# Patient Record
Sex: FEMALE | Race: WHITE | Employment: FULL TIME | URBAN - METROPOLITAN AREA
[De-identification: names, ages, dates, MRNs, and addresses within clinical notes are randomized per-mention and may not be internally consistent; named-entity substitution may affect disease eponyms.]

---

## 2018-01-09 PROBLEM — Z01.89 ENCOUNTER FOR PAIN MANAGEMENT PLANNING: Status: ACTIVE | Noted: 2018-01-09

## 2018-01-09 PROBLEM — M51.26 LUMBAGO DUE TO DISPLACEMENT OF INTERVERTEBRAL DISC: Status: ACTIVE | Noted: 2018-01-09

## 2018-01-09 PROBLEM — M47.26 OSTEOARTHRITIS OF SPINE WITH RADICULOPATHY, LUMBAR REGION: Status: ACTIVE | Noted: 2018-01-09

## 2018-01-12 PROCEDURE — 80307 DRUG TEST PRSMV CHEM ANLYZR: CPT | Performed by: INTERNAL MEDICINE

## 2019-08-19 RX ORDER — PREGABALIN 25 MG/1
25 CAPSULE ORAL 2 TIMES DAILY
COMMUNITY
End: 2019-10-21

## 2019-08-19 RX ORDER — OXYCODONE AND ACETAMINOPHEN 10; 325 MG/1; MG/1
1 TABLET ORAL EVERY 4 HOURS PRN
COMMUNITY
End: 2019-08-22

## 2019-08-22 RX ORDER — CHLORZOXAZONE 750 MG/1
375 TABLET ORAL 2 TIMES DAILY
COMMUNITY
End: 2020-05-12

## 2019-08-27 ENCOUNTER — HOSPITAL ENCOUNTER (OUTPATIENT)
Dept: INTERVENTIONAL RADIOLOGY/VASCULAR | Facility: HOSPITAL | Age: 44
Discharge: HOME | End: 2019-08-27
Attending: OBSTETRICS & GYNECOLOGY | Admitting: OBSTETRICS & GYNECOLOGY
Payer: COMMERCIAL

## 2019-08-27 VITALS
HEIGHT: 63 IN | HEART RATE: 81 BPM | SYSTOLIC BLOOD PRESSURE: 100 MMHG | WEIGHT: 138 LBS | RESPIRATION RATE: 16 BRPM | BODY MASS INDEX: 24.45 KG/M2 | DIASTOLIC BLOOD PRESSURE: 60 MMHG | OXYGEN SATURATION: 97 % | TEMPERATURE: 98 F

## 2019-08-27 DIAGNOSIS — N94.89 PELVIC CONGESTION: ICD-10-CM

## 2019-08-27 LAB — INR: 1.1 (ref 0.8–1.3)

## 2019-08-27 PROCEDURE — B51HYZZ FLUOROSCOPY OF BILATERAL PELVIC (ILIAC) VEINS USING OTHER CONTRAST: ICD-10-PCS | Performed by: RADIOLOGY

## 2019-08-27 PROCEDURE — B51VYZZ FLUOROSCOPY OF OTHER VEINS USING OTHER CONTRAST: ICD-10-PCS | Performed by: RADIOLOGY

## 2019-08-27 PROCEDURE — 76937 US GUIDE VASCULAR ACCESS: CPT

## 2019-08-27 PROCEDURE — 75822 VEIN X-RAY ARMS/LEGS: CPT

## 2019-08-27 PROCEDURE — 36011 PLACE CATHETER IN VEIN: CPT

## 2019-08-27 PROCEDURE — 85610 PROTHROMBIN TIME: CPT

## 2019-08-27 PROCEDURE — 36012 PLACE CATHETER IN VEIN: CPT

## 2019-08-27 PROCEDURE — 99153 MOD SED SAME PHYS/QHP EA: CPT

## 2019-08-27 PROCEDURE — 75831 VEIN X-RAY KIDNEY: CPT

## 2019-08-27 PROCEDURE — 99152 MOD SED SAME PHYS/QHP 5/>YRS: CPT

## 2019-08-27 PROCEDURE — B51KYZZ FLUOROSCOPY OF LEFT RENAL VEIN USING OTHER CONTRAST: ICD-10-PCS | Performed by: RADIOLOGY

## 2019-08-27 RX ORDER — HEPARIN SODIUM 5000 [USP'U]/ML
INJECTION, SOLUTION INTRAVENOUS; SUBCUTANEOUS
Status: COMPLETED
Start: 2019-08-27 | End: 2019-08-27

## 2019-08-27 RX ORDER — MIDAZOLAM HYDROCHLORIDE 1 MG/ML
INJECTION INTRAMUSCULAR; INTRAVENOUS
Status: COMPLETED
Start: 2019-08-27 | End: 2019-08-27

## 2019-08-27 RX ORDER — LIDOCAINE HYDROCHLORIDE 10 MG/ML
INJECTION, SOLUTION INFILTRATION; PERINEURAL
Status: COMPLETED
Start: 2019-08-27 | End: 2019-08-27

## 2019-08-27 RX ORDER — SODIUM CHLORIDE 9 MG/ML
INJECTION, SOLUTION INTRAVENOUS CONTINUOUS
Status: DISCONTINUED | OUTPATIENT
Start: 2019-08-27 | End: 2019-08-27

## 2019-08-27 RX ADMIN — SODIUM CHLORIDE: 9 INJECTION, SOLUTION INTRAVENOUS at 08:30:00

## 2019-09-03 NOTE — H&P
[facility]    History and Physical    Formerly [facility] Patient Status:  Surgery Admit Karyle Norma    5/10/1975 MRN [id number]   [facility] SURGERY Attending Josie Cooper MD   Hosp Day # 0 PCP PHYSICIAN NONSTAFF     Date:  9/3/2019  D other systems reviewed and are negative. Physical Exam:   Vital Signs:  Height 5' 3\" (1.6 m), weight 134 lb (60.8 kg), last menstrual period 08/08/2019, not currently breastfeeding. Constitutional: She is oriented to person, place, and time.  She

## 2019-09-04 ENCOUNTER — ANESTHESIA EVENT (OUTPATIENT)
Dept: SURGERY | Facility: HOSPITAL | Age: 44
End: 2019-09-04
Payer: COMMERCIAL

## 2019-09-04 ENCOUNTER — HOSPITAL ENCOUNTER (OUTPATIENT)
Facility: HOSPITAL | Age: 44
Discharge: HOME | End: 2019-09-05
Attending: OBSTETRICS & GYNECOLOGY | Admitting: OBSTETRICS & GYNECOLOGY
Payer: COMMERCIAL

## 2019-09-04 ENCOUNTER — ANESTHESIA (OUTPATIENT)
Dept: SURGERY | Facility: HOSPITAL | Age: 44
End: 2019-09-04
Payer: COMMERCIAL

## 2019-09-04 LAB
EXPIRATION DATE: NORMAL
POCT LOT NUMBER: NORMAL
POCT URINE PREGNANCY: NEGATIVE

## 2019-09-04 PROCEDURE — 0UT74ZZ RESECTION OF BILATERAL FALLOPIAN TUBES, PERCUTANEOUS ENDOSCOPIC APPROACH: ICD-10-PCS | Performed by: OBSTETRICS & GYNECOLOGY

## 2019-09-04 PROCEDURE — 8E0W4CZ ROBOTIC ASSISTED PROCEDURE OF TRUNK REGION, PERCUTANEOUS ENDOSCOPIC APPROACH: ICD-10-PCS | Performed by: OBSTETRICS & GYNECOLOGY

## 2019-09-04 PROCEDURE — 0UT94ZZ RESECTION OF UTERUS, PERCUTANEOUS ENDOSCOPIC APPROACH: ICD-10-PCS | Performed by: OBSTETRICS & GYNECOLOGY

## 2019-09-04 PROCEDURE — 0DNP4ZZ RELEASE RECTUM, PERCUTANEOUS ENDOSCOPIC APPROACH: ICD-10-PCS | Performed by: OBSTETRICS & GYNECOLOGY

## 2019-09-04 PROCEDURE — 99255 IP/OBS CONSLTJ NEW/EST HI 80: CPT | Performed by: HOSPITALIST

## 2019-09-04 PROCEDURE — 3E0T3BZ INTRODUCTION OF ANESTHETIC AGENT INTO PERIPHERAL NERVES AND PLEXI, PERCUTANEOUS APPROACH: ICD-10-PCS | Performed by: ANESTHESIOLOGY

## 2019-09-04 PROCEDURE — 0UN24ZZ RELEASE BILATERAL OVARIES, PERCUTANEOUS ENDOSCOPIC APPROACH: ICD-10-PCS | Performed by: OBSTETRICS & GYNECOLOGY

## 2019-09-04 PROCEDURE — 06NY4ZZ RELEASE LOWER VEIN, PERCUTANEOUS ENDOSCOPIC APPROACH: ICD-10-PCS | Performed by: OBSTETRICS & GYNECOLOGY

## 2019-09-04 DEVICE — INTERCEED: Type: IMPLANTABLE DEVICE | Site: ABDOMEN | Status: FUNCTIONAL

## 2019-09-04 RX ORDER — ONDANSETRON 4 MG/1
4 TABLET, FILM COATED ORAL EVERY 8 HOURS PRN
Status: DISCONTINUED | OUTPATIENT
Start: 2019-09-04 | End: 2019-09-05

## 2019-09-04 RX ORDER — ONDANSETRON 2 MG/ML
4 INJECTION INTRAMUSCULAR; INTRAVENOUS AS NEEDED
Status: DISCONTINUED | OUTPATIENT
Start: 2019-09-04 | End: 2019-09-04 | Stop reason: HOSPADM

## 2019-09-04 RX ORDER — CEFAZOLIN SODIUM/WATER 2 G/20 ML
SYRINGE (ML) INTRAVENOUS
Status: DISPENSED
Start: 2019-09-04 | End: 2019-09-04

## 2019-09-04 RX ORDER — CEFAZOLIN SODIUM/WATER 2 G/20 ML
2 SYRINGE (ML) INTRAVENOUS ONCE
Status: DISCONTINUED | OUTPATIENT
Start: 2019-09-04 | End: 2019-09-04

## 2019-09-04 RX ORDER — PHENAZOPYRIDINE HYDROCHLORIDE 200 MG/1
TABLET, FILM COATED ORAL
Status: COMPLETED
Start: 2019-09-04 | End: 2019-09-04

## 2019-09-04 RX ORDER — SODIUM CHLORIDE, SODIUM LACTATE, POTASSIUM CHLORIDE, CALCIUM CHLORIDE 600; 310; 30; 20 MG/100ML; MG/100ML; MG/100ML; MG/100ML
INJECTION, SOLUTION INTRAVENOUS CONTINUOUS
Status: DISCONTINUED | OUTPATIENT
Start: 2019-09-04 | End: 2019-09-05

## 2019-09-04 RX ORDER — MIDAZOLAM HYDROCHLORIDE 1 MG/ML
0.5 INJECTION INTRAMUSCULAR; INTRAVENOUS EVERY 5 MIN PRN
Status: DISCONTINUED | OUTPATIENT
Start: 2019-09-04 | End: 2019-09-04 | Stop reason: HOSPADM

## 2019-09-04 RX ORDER — MIDAZOLAM HYDROCHLORIDE 1 MG/ML
INJECTION INTRAMUSCULAR; INTRAVENOUS
Status: COMPLETED
Start: 2019-09-04 | End: 2019-09-04

## 2019-09-04 RX ORDER — SODIUM CHLORIDE, SODIUM LACTATE, POTASSIUM CHLORIDE, CALCIUM CHLORIDE 600; 310; 30; 20 MG/100ML; MG/100ML; MG/100ML; MG/100ML
INJECTION, SOLUTION INTRAVENOUS CONTINUOUS
Status: DISCONTINUED | OUTPATIENT
Start: 2019-09-04 | End: 2019-09-04 | Stop reason: HOSPADM

## 2019-09-04 RX ORDER — DIPHENHYDRAMINE HCL 25 MG
25 CAPSULE ORAL NIGHTLY
Status: DISCONTINUED | OUTPATIENT
Start: 2019-09-04 | End: 2019-09-05

## 2019-09-04 RX ORDER — DEXAMETHASONE SODIUM PHOSPHATE 4 MG/ML
4 VIAL (ML) INJECTION AS NEEDED
Status: DISCONTINUED | OUTPATIENT
Start: 2019-09-04 | End: 2019-09-04 | Stop reason: HOSPADM

## 2019-09-04 RX ORDER — PREGABALIN 25 MG/1
25 CAPSULE ORAL 2 TIMES DAILY
Status: DISCONTINUED | OUTPATIENT
Start: 2019-09-04 | End: 2019-09-05

## 2019-09-04 RX ORDER — ACETAMINOPHEN 10 MG/ML
1000 INJECTION, SOLUTION INTRAVENOUS ONCE
Status: DISCONTINUED | OUTPATIENT
Start: 2019-09-04 | End: 2019-09-04 | Stop reason: HOSPADM

## 2019-09-04 RX ORDER — HYDROMORPHONE HYDROCHLORIDE 1 MG/ML
INJECTION, SOLUTION INTRAMUSCULAR; INTRAVENOUS; SUBCUTANEOUS
Status: COMPLETED
Start: 2019-09-04 | End: 2019-09-04

## 2019-09-04 RX ORDER — MAGNESIUM HYDROXIDE 1200 MG/15ML
LIQUID ORAL AS NEEDED
Status: DISCONTINUED | OUTPATIENT
Start: 2019-09-04 | End: 2019-09-04 | Stop reason: HOSPADM

## 2019-09-04 RX ORDER — METOCLOPRAMIDE HYDROCHLORIDE 5 MG/ML
10 INJECTION INTRAMUSCULAR; INTRAVENOUS AS NEEDED
Status: DISCONTINUED | OUTPATIENT
Start: 2019-09-04 | End: 2019-09-04 | Stop reason: HOSPADM

## 2019-09-04 RX ORDER — NALOXONE HYDROCHLORIDE 0.4 MG/ML
80 INJECTION, SOLUTION INTRAMUSCULAR; INTRAVENOUS; SUBCUTANEOUS AS NEEDED
Status: DISCONTINUED | OUTPATIENT
Start: 2019-09-04 | End: 2019-09-04 | Stop reason: HOSPADM

## 2019-09-04 RX ORDER — HYDROMORPHONE HYDROCHLORIDE 1 MG/ML
0.4 INJECTION, SOLUTION INTRAMUSCULAR; INTRAVENOUS; SUBCUTANEOUS EVERY 5 MIN PRN
Status: DISCONTINUED | OUTPATIENT
Start: 2019-09-04 | End: 2019-09-04 | Stop reason: HOSPADM

## 2019-09-04 RX ORDER — ALBUTEROL SULFATE 90 UG/1
2 AEROSOL, METERED RESPIRATORY (INHALATION) EVERY 4 HOURS PRN
Status: DISCONTINUED | OUTPATIENT
Start: 2019-09-04 | End: 2019-09-05

## 2019-09-04 RX ORDER — ESCITALOPRAM OXALATE 10 MG/1
10 TABLET ORAL DAILY
Status: DISCONTINUED | OUTPATIENT
Start: 2019-09-04 | End: 2019-09-05

## 2019-09-04 RX ORDER — BUPIVACAINE HYDROCHLORIDE 5 MG/ML
INJECTION, SOLUTION EPIDURAL; INTRACAUDAL AS NEEDED
Status: DISCONTINUED | OUTPATIENT
Start: 2019-09-04 | End: 2019-09-04 | Stop reason: HOSPADM

## 2019-09-04 RX ORDER — PHENAZOPYRIDINE HYDROCHLORIDE 200 MG/1
200 TABLET, FILM COATED ORAL ONCE
Status: COMPLETED | OUTPATIENT
Start: 2019-09-04 | End: 2019-09-04

## 2019-09-04 RX ORDER — KETOROLAC TROMETHAMINE 30 MG/ML
30 INJECTION, SOLUTION INTRAMUSCULAR; INTRAVENOUS ONCE
Status: COMPLETED | OUTPATIENT
Start: 2019-09-04 | End: 2019-09-04

## 2019-09-04 RX ORDER — OXYCODONE HYDROCHLORIDE 5 MG/1
10 TABLET ORAL
Status: DISCONTINUED | OUTPATIENT
Start: 2019-09-04 | End: 2019-09-05

## 2019-09-04 RX ORDER — BUTALBITAL, ACETAMINOPHEN AND CAFFEINE 50; 325; 40 MG/1; MG/1; MG/1
1 TABLET ORAL EVERY 6 HOURS PRN
Status: DISCONTINUED | OUTPATIENT
Start: 2019-09-04 | End: 2019-09-04

## 2019-09-04 RX ORDER — BUPRENORPHINE 20 UG/H
1 PATCH TRANSDERMAL WEEKLY
Status: DISCONTINUED | OUTPATIENT
Start: 2019-09-04 | End: 2019-09-05

## 2019-09-04 RX ORDER — ACETAMINOPHEN 10 MG/ML
1000 INJECTION, SOLUTION INTRAVENOUS EVERY 6 HOURS
Status: DISCONTINUED | OUTPATIENT
Start: 2019-09-04 | End: 2019-09-05

## 2019-09-04 RX ORDER — ACETAMINOPHEN 500 MG
1000 TABLET ORAL ONCE
Status: DISCONTINUED | OUTPATIENT
Start: 2019-09-04 | End: 2019-09-04

## 2019-09-04 RX ORDER — CEFAZOLIN SODIUM 1 G/3ML
2 INJECTION, POWDER, FOR SOLUTION INTRAMUSCULAR; INTRAVENOUS ONCE
Status: COMPLETED | OUTPATIENT
Start: 2019-09-04 | End: 2019-09-04

## 2019-09-04 RX ORDER — ONDANSETRON 2 MG/ML
4 INJECTION INTRAMUSCULAR; INTRAVENOUS EVERY 8 HOURS PRN
Status: DISCONTINUED | OUTPATIENT
Start: 2019-09-04 | End: 2019-09-05

## 2019-09-04 NOTE — BRIEF OP NOTE
Pre-Operative Diagnosis: pelvic pain     Post-Operative Diagnosis: pelvic pain      Procedure Performed:   Procedure(s):  ROBOTIC ASSIST, TOTAL LAPAROSCOPIC HYSTERECTOMY, BILATERAL SALPINGECTOMY  CLIP LEFT GONADAL VEIN, ENTEROLYSIS , CYSTOSCOPY    Surgeon(

## 2019-09-04 NOTE — ANESTHESIA PREPROCEDURE EVALUATION
PRE-OP EVALUATION    Patient Name: Anabella Menjivar    Pre-op Diagnosis: pelvic pain    Procedure(s):  ROBOTIC ASSIST, TOTAL LAPAROSCOPIC HYSTERECTOMY, BILATERAL SALPINGECTOMY  CLIP GONADAL VEIN, LEFT UPPER QUADRANT INCISION.     Surgeon(s) and Role:     * Mi GI/Hepatic/Renal                                 Cardiovascular        Exercise tolerance: good     MET: >4                                           Endo/Other               (+) anemia                   Pulmonary  Comment: Current smoker    (+) asthma hours.  She will reapply patch last this afternoon. We will follow other postoperative pain orders recommended by him. Risks and plan d/w pt - all questions answered.     Plan/risks discussed with: patient  Use of blood product(s) discussed with: patient

## 2019-09-04 NOTE — PROGRESS NOTES
Pain Service    POD#0 s/p ROBOTIC ASSIST, TOTAL LAPAROSCOPIC HYSTERECTOMY, BILATERAL SALPINGECTOMY  CLIP GONADAL VEIN, ENTEROLYSIS OF ENDOMETRIOSIS, CYSTOSCOPY    Patient with PMH Chiari I malformation and headaches and chronic pelvic pain.  Patient sees

## 2019-09-04 NOTE — BRIEF OP NOTE
Pre-Operative Diagnosis:  PELVIC PAIN     Post-Operative Diagnosis:  PELVIC PAIN    Procedures Performed:  ROBOTIC ASSIST FOR: CLIP LEFT GONADAL VEIN AND ENTEROLYSIS OF ENDOMETRIOSIS; TOTAL LAPAROSCOPIC HYSTERECTOMY; BILATERAL SALPINGECTOMIES; CYSTOSCOPY

## 2019-09-04 NOTE — CONSULTS
[facility]  [address] [facility] Patient Status:  Inpatient    5/10/1975 MRN [id number]   Location [facility] 3NW-A Attending Elizabeth Calixto MD   Hosp Day # 0 PCP PHYSICIAN NONSTAFF     Reason for consult: med management and p Egg-Derived*    OTHER (SEE COMMENTS)    Comment:abd pain  Levaquin [Levofloxa*    RASH    Medications:    No current facility-administered medications on file prior to encounter.    Current Outpatient Medications on File Prior to Encounter:  Acetaminophen ( deformity. Abdomen: Soft, TTP, hypoactive bowel sounds. Neurologic: No focal neurological deficits. CNII-XII grossly intact. Musculoskeletal: Moves all extremities. Extremities: No edema or cyanosis. Integument: No rashes or lesions.    Psychiatric: Ap

## 2019-09-04 NOTE — ANESTHESIA POSTPROCEDURE EVALUATION
[address] [facility] Patient Status:  Surgery Admit - Inpt   Age/Gender 40year old female MRN [id number]   [facility] SURGERY Attending Dima Macias MD   Hosp Day # 0 PCP PHYSICIAN NONSTAFF       Anesthesia Post-op Note

## 2019-09-04 NOTE — INTERVAL H&P NOTE
Pre-op Diagnosis: pelvic pain    The above referenced H&P was reviewed by Md Clarisa Aleman MD on 9/4/2019, the patient was examined and no significant changes have occurred in the patient's condition since the H&P was performed.   I discussed with the pa

## 2019-09-04 NOTE — OPERATIVE REPORT
[facility]    PATIENT'S NAME: No Seals   ATTENDING PHYSICIAN: Monika Dillon M.D. OPERATING PHYSICIAN: Monika Dillon M.D.    PATIENT ACCOUNT#:   [de-identified]    LOCATION:  OR  OR [place] ROOM[address] ED[address] [address] [facility] #:   [id number]       NAFISA with findings noted above. Weighted speculum introduced into the vaginal vault. Anterior lip of cervix was grasped with a single-tooth tenaculum. Uterus sounded to 8 cm. Jarcho cannula placed. Attention was now directed toward the umbilicus.   The umbi desiccated with the PK forceps, cut with Harmonic Ace; the same was done on the left. The posterior broad ligament peritoneum was opened with Harmonic Ace.   Now, the left uterine vessels were skeletonized with Harmonic Ace, coagulated with PK forceps, cut

## 2019-09-05 VITALS
OXYGEN SATURATION: 97 % | HEART RATE: 60 BPM | WEIGHT: 136.25 LBS | SYSTOLIC BLOOD PRESSURE: 96 MMHG | RESPIRATION RATE: 18 BRPM | BODY MASS INDEX: 24.14 KG/M2 | HEIGHT: 63 IN | DIASTOLIC BLOOD PRESSURE: 54 MMHG | TEMPERATURE: 99 F

## 2019-09-05 RX ORDER — DOCUSATE SODIUM 100 MG/1
100 CAPSULE, LIQUID FILLED ORAL 2 TIMES DAILY
Status: DISCONTINUED | OUTPATIENT
Start: 2019-09-05 | End: 2019-09-05

## 2019-09-05 NOTE — PLAN OF CARE
Upon reassessment, pt reports no improvement with Ofirmev IV and Oxy IR. Pt continues to report abdominal pain 5/10. Pt also c/o headache. [employer] notified. One-time dose of Toradol ordered and executed. Will continue to monitor.

## 2019-09-05 NOTE — PROGRESS NOTES
Pain Service  POD #1 s/p ROBOTIC ASSIST, TOTAL LAPAROSCOPIC HYSTERECTOMY, BILATERAL SALPINGECTOMY  CLIP GONADAL VEIN, ENTEROLYSIS OF ENDOMETRIOSIS, CYSTOSCOPY     Patient with PMH Chiari I malformation and headaches and chronic pelvic pain.  Patient sees

## 2019-09-05 NOTE — PROGRESS NOTES
Notified Dr. Abbie Felipe that the patient was cleared for discharge by the Surgery Service and Pain Service. Dr. Abbie Felipe stated that he reviewed the patient's discharge medication list and the patient is okay to go home.

## 2019-09-05 NOTE — PLAN OF CARE
NURSING ADMISSION NOTE      Patient admitted via bed from PACU  Oriented to room. Safety precautions initiated. Bed in low position. Call light in reach.     Pt arrived upset that Dr. Ana Song didn't explain operative findings with her and she \"didn't

## 2019-09-05 NOTE — PLAN OF CARE
Problem: Patient/Family Goals  Goal: Patient/Family Long Term Goal  Description  Patient's Long Term Goal: go home  Interventions:  - Follow POC/Interventions  - See additional Care Plan goals for specific interventions   Outcome: Progressing  Goal: [facility] ADULT  Goal: Verbalizes/displays adequate comfort level or patient's stated pain goal  Description  INTERVENTIONS:  - Encourage pt to monitor pain and request assistance  - Assess pain using appropriate pain scale  - Administer analgesics based on type and

## 2019-09-05 NOTE — PLAN OF CARE
Problem: GASTROINTESTINAL - ADULT  Goal: Minimal or absence of nausea and vomiting  Description  INTERVENTIONS:  - Maintain adequate hydration with IV or PO as ordered and tolerated  - Nasogastric tube to low intermittent suction as ordered  - Evaluate e w/pt and caregiver  - Include patient/family/discharge partner in discharge planning  - Arrange for needed discharge resources and transportation as appropriate  - Identify discharge learning needs (meds, wound care, etc)  - Arrange for interpreters to ass

## 2019-09-05 NOTE — PROGRESS NOTES
POD NUMBER 1    S - Patient doing O.K. Patient desires to go home today. O - Afebrile VSS  Abdomen Soft, essentially non-tender, no rebound; incisions covered with Tegaderm Dressings  Calves Non-tender bilaterally  A- Stable  P -  1.   Findings noted at t

## 2022-03-18 ENCOUNTER — LAB ENCOUNTER (OUTPATIENT)
Dept: LAB | Facility: HOSPITAL | Age: 47
End: 2022-03-18
Attending: INTERNAL MEDICINE
Payer: COMMERCIAL

## 2022-03-18 DIAGNOSIS — E88.81 DYSMETABOLIC SYNDROME X: ICD-10-CM

## 2022-03-18 DIAGNOSIS — R53.82 CHRONIC FATIGUE SYNDROME: ICD-10-CM

## 2022-03-18 DIAGNOSIS — M19.90 SENILE ARTHRITIS: ICD-10-CM

## 2022-03-18 DIAGNOSIS — E27.49 GLUCOCORTICOID DEFICIENCY (HCC): Primary | ICD-10-CM

## 2022-03-18 DIAGNOSIS — R63.5 ABNORMAL WEIGHT GAIN: ICD-10-CM

## 2022-03-18 LAB
HBA1C MFR BLD: 5.8 % (ref ?–5.7)
T3FREE SERPL-MCNC: 2.2 PG/ML (ref 2.4–4.2)
T4 FREE SERPL-MCNC: 1 NG/DL (ref 0.8–1.7)
TSI SER-ACNC: 0.61 MIU/ML (ref 0.36–3.74)
VIT D+METAB SERPL-MCNC: 33.5 NG/ML (ref 30–100)

## 2022-03-18 PROCEDURE — 86376 MICROSOMAL ANTIBODY EACH: CPT

## 2022-03-18 PROCEDURE — 86800 THYROGLOBULIN ANTIBODY: CPT

## 2022-03-18 PROCEDURE — 84439 ASSAY OF FREE THYROXINE: CPT

## 2022-03-18 PROCEDURE — 84146 ASSAY OF PROLACTIN: CPT

## 2022-03-18 PROCEDURE — 83036 HEMOGLOBIN GLYCOSYLATED A1C: CPT

## 2022-03-18 PROCEDURE — 82306 VITAMIN D 25 HYDROXY: CPT

## 2022-03-18 PROCEDURE — 84443 ASSAY THYROID STIM HORMONE: CPT

## 2022-03-18 PROCEDURE — 84681 ASSAY OF C-PEPTIDE: CPT

## 2022-03-18 PROCEDURE — 36415 COLL VENOUS BLD VENIPUNCTURE: CPT

## 2022-03-18 PROCEDURE — 84481 FREE ASSAY (FT-3): CPT

## 2022-03-21 LAB
C-PEPTIDE, SERUM OR PLASMA: 1.7 NG/ML
THYROGLOBULIN AB: <0.9 IU/ML
THYROID PEROXIDASE (TPO) ANTIBODY: <0.3 IU/ML

## 2022-04-01 ENCOUNTER — TELEPHONE (OUTPATIENT)
Dept: HEMATOLOGY/ONCOLOGY | Facility: HOSPITAL | Age: 47
End: 2022-04-01

## 2022-04-01 NOTE — TELEPHONE ENCOUNTER
Returning patient's call regarding [facility] test & blood test, LVMTCB.  Call #1 4/4/22 Mimiej [phone]

## 2022-04-05 PROBLEM — E27.49 ADRENAL FAILURE (HCC): Status: ACTIVE | Noted: 2022-04-05

## 2022-04-07 ENCOUNTER — OFFICE VISIT (OUTPATIENT)
Dept: HEMATOLOGY/ONCOLOGY | Facility: HOSPITAL | Age: 47
End: 2022-04-07
Attending: INTERNAL MEDICINE
Payer: COMMERCIAL

## 2022-04-07 VITALS
OXYGEN SATURATION: 96 % | HEART RATE: 88 BPM | RESPIRATION RATE: 16 BRPM | TEMPERATURE: 99 F | SYSTOLIC BLOOD PRESSURE: 112 MMHG | DIASTOLIC BLOOD PRESSURE: 73 MMHG

## 2022-04-07 DIAGNOSIS — E27.49 ADRENAL FAILURE (HCC): Primary | ICD-10-CM

## 2022-04-07 LAB
CORTIS SERPL-MCNC: 11.9 UG/DL
CORTIS SERPL-MCNC: 14.4 UG/DL
CORTIS SERPL-MCNC: 2.8 UG/DL

## 2022-04-07 PROCEDURE — 82533 TOTAL CORTISOL: CPT

## 2022-04-07 PROCEDURE — 96374 THER/PROPH/DIAG INJ IV PUSH: CPT

## 2022-04-07 PROCEDURE — 99211 OFF/OP EST MAY X REQ PHY/QHP: CPT

## 2022-04-07 RX ORDER — COSYNTROPIN 0.25 MG/ML
INJECTION, POWDER, FOR SOLUTION INTRAMUSCULAR; INTRAVENOUS
Status: COMPLETED
Start: 2022-04-07 | End: 2022-04-07

## 2022-04-07 RX ORDER — COSYNTROPIN 0.25 MG/ML
0.25 INJECTION, POWDER, FOR SOLUTION INTRAMUSCULAR; INTRAVENOUS ONCE
Status: COMPLETED | OUTPATIENT
Start: 2022-04-07 | End: 2022-04-07

## 2022-04-07 RX ORDER — COSYNTROPIN 0.25 MG/ML
0.25 INJECTION, POWDER, FOR SOLUTION INTRAMUSCULAR; INTRAVENOUS ONCE
Status: CANCELLED | OUTPATIENT
Start: 2022-04-07 | End: 2022-04-07

## 2022-04-07 RX ADMIN — COSYNTROPIN 0.25 MG: 0.25 INJECTION, POWDER, FOR SOLUTION INTRAMUSCULAR; INTRAVENOUS at 07:46:00

## 2022-04-07 NOTE — PROGRESS NOTES
Pt to infusion for ACTH stim test. Arrives ambulating independently. Pt's only complaint today is fatigue, claims she has not been taking her hydrocortisone per her MD's order in preparation for this test.    Procedure explained to pt, including multiple lab draws, administration of cortrosyn, and establishing PIV. Pt stated understanding. PIV established to left AC, present blood return noted. Baseline cortisol level drawn and sent. IVP Cortrosyn given. 30 min and 60 min labs drawn through PIV. Pt tolerated test well. PIV removed, site covered with gauze and coban. Discharged stable and ambulating independently - stated MD will follow up with pt about results. Copy of AVS provided.

## 2022-12-21 NOTE — PROGRESS NOTES
Rc'd pt from cath lab in stable condition. VSS. Manual dressing to right groin is soft, clean and dry. No bleeding or hematoma. Pt denies c/o pain or discomfort.  at bedside. Pt on bedrest for 2hrs. 12:35: Bedrest completed.  Pt ambulated and cecile
no

## 2025-02-18 ENCOUNTER — OFFICE VISIT (OUTPATIENT)
Dept: RHEUMATOLOGY | Facility: CLINIC | Age: 50
End: 2025-02-18
Payer: COMMERCIAL

## 2025-02-18 ENCOUNTER — LAB ENCOUNTER (OUTPATIENT)
Dept: LAB | Age: 50
End: 2025-02-18
Attending: INTERNAL MEDICINE
Payer: COMMERCIAL

## 2025-02-18 VITALS
TEMPERATURE: 98 F | WEIGHT: 187 LBS | OXYGEN SATURATION: 98 % | HEART RATE: 90 BPM | HEIGHT: 63 IN | SYSTOLIC BLOOD PRESSURE: 120 MMHG | RESPIRATION RATE: 16 BRPM | DIASTOLIC BLOOD PRESSURE: 80 MMHG | BODY MASS INDEX: 33.13 KG/M2

## 2025-02-18 DIAGNOSIS — M12.30 PALINDROMIC RHEUMATISM: ICD-10-CM

## 2025-02-18 DIAGNOSIS — G89.4 CHRONIC PAIN SYNDROME: Primary | ICD-10-CM

## 2025-02-18 DIAGNOSIS — M79.7 CHRONIC FATIGUE SYNDROME WITH FIBROMYALGIA: ICD-10-CM

## 2025-02-18 DIAGNOSIS — G93.32 CHRONIC FATIGUE SYNDROME WITH FIBROMYALGIA: ICD-10-CM

## 2025-02-18 DIAGNOSIS — G89.4 CHRONIC PAIN SYNDROME: ICD-10-CM

## 2025-02-18 DIAGNOSIS — M47.26 OSTEOARTHRITIS OF SPINE WITH RADICULOPATHY, LUMBAR REGION: ICD-10-CM

## 2025-02-18 DIAGNOSIS — R79.82 ELEVATED C-REACTIVE PROTEIN (CRP): ICD-10-CM

## 2025-02-18 DIAGNOSIS — M25.562 LEFT KNEE PAIN, UNSPECIFIED CHRONICITY: ICD-10-CM

## 2025-02-18 DIAGNOSIS — M79.671 FOOT PAIN, RIGHT: ICD-10-CM

## 2025-02-18 LAB
ALBUMIN SERPL-MCNC: 5.1 G/DL (ref 3.2–4.8)
ALBUMIN/GLOB SERPL: 2 {RATIO} (ref 1–2)
ALP LIVER SERPL-CCNC: 85 U/L
ALT SERPL-CCNC: 37 U/L
ANION GAP SERPL CALC-SCNC: 9 MMOL/L (ref 0–18)
AST SERPL-CCNC: 17 U/L (ref ?–34)
BASOPHILS # BLD AUTO: 0.07 X10(3) UL (ref 0–0.2)
BASOPHILS NFR BLD AUTO: 0.6 %
BILIRUB SERPL-MCNC: 0.2 MG/DL (ref 0.3–1.2)
BUN BLD-MCNC: 20 MG/DL (ref 9–23)
CALCIUM BLD-MCNC: 10.4 MG/DL (ref 8.7–10.6)
CHLORIDE SERPL-SCNC: 101 MMOL/L (ref 98–112)
CO2 SERPL-SCNC: 31 MMOL/L (ref 21–32)
CREAT BLD-MCNC: 1.04 MG/DL
CRP SERPL-MCNC: 1.5 MG/DL (ref ?–0.5)
EGFRCR SERPLBLD CKD-EPI 2021: 66 ML/MIN/1.73M2 (ref 60–?)
EOSINOPHIL # BLD AUTO: 0.28 X10(3) UL (ref 0–0.7)
EOSINOPHIL NFR BLD AUTO: 2.5 %
ERYTHROCYTE [DISTWIDTH] IN BLOOD BY AUTOMATED COUNT: 13.7 %
ERYTHROCYTE [SEDIMENTATION RATE] IN BLOOD: 67 MM/HR
FASTING STATUS PATIENT QL REPORTED: YES
GLOBULIN PLAS-MCNC: 2.5 G/DL (ref 2–3.5)
GLUCOSE BLD-MCNC: 94 MG/DL (ref 70–99)
HCT VFR BLD AUTO: 44.5 %
HGB BLD-MCNC: 14.4 G/DL
IGA SERPL-MCNC: 212.2 MG/DL (ref 40–350)
IGM SERPL-MCNC: 162.8 MG/DL (ref 50–300)
IMM GRANULOCYTES # BLD AUTO: 0.07 X10(3) UL (ref 0–1)
IMM GRANULOCYTES NFR BLD: 0.6 %
IMMUNOGLOBULIN PNL SER-MCNC: 794 MG/DL (ref 650–1600)
LYMPHOCYTES # BLD AUTO: 2.24 X10(3) UL (ref 1–4)
LYMPHOCYTES NFR BLD AUTO: 19.7 %
MCH RBC QN AUTO: 31.8 PG (ref 26–34)
MCHC RBC AUTO-ENTMCNC: 32.4 G/DL (ref 31–37)
MCV RBC AUTO: 98.2 FL
MONOCYTES # BLD AUTO: 0.97 X10(3) UL (ref 0.1–1)
MONOCYTES NFR BLD AUTO: 8.5 %
NEUTROPHILS # BLD AUTO: 7.72 X10 (3) UL (ref 1.5–7.7)
NEUTROPHILS # BLD AUTO: 7.72 X10(3) UL (ref 1.5–7.7)
NEUTROPHILS NFR BLD AUTO: 68.1 %
OSMOLALITY SERPL CALC.SUM OF ELEC: 294 MOSM/KG (ref 275–295)
PLATELET # BLD AUTO: 304 10(3)UL (ref 150–450)
POTASSIUM SERPL-SCNC: 4.6 MMOL/L (ref 3.5–5.1)
PROT SERPL-MCNC: 7.6 G/DL (ref 5.7–8.2)
RBC # BLD AUTO: 4.53 X10(6)UL
RHEUMATOID FACT SERPL-ACNC: <3.5 IU/ML (ref ?–14)
SODIUM SERPL-SCNC: 141 MMOL/L (ref 136–145)
T3FREE SERPL-MCNC: 2.96 PG/ML (ref 2.4–4.2)
T4 FREE SERPL-MCNC: 1.4 NG/DL (ref 0.8–1.7)
THYROGLOB SERPL-MCNC: <15 U/ML (ref ?–60)
THYROPEROXIDASE AB SERPL-ACNC: 30 U/ML (ref ?–60)
TSI SER-ACNC: 0.46 UIU/ML (ref 0.55–4.78)
URATE SERPL-MCNC: 6.1 MG/DL
VIT B12 SERPL-MCNC: 386 PG/ML (ref 211–911)
VIT D+METAB SERPL-MCNC: 50.8 NG/ML (ref 30–100)
WBC # BLD AUTO: 11.4 X10(3) UL (ref 4–11)

## 2025-02-18 PROCEDURE — 87522 HEPATITIS C REVRS TRNSCRPJ: CPT

## 2025-02-18 PROCEDURE — 82306 VITAMIN D 25 HYDROXY: CPT

## 2025-02-18 PROCEDURE — 86200 CCP ANTIBODY: CPT | Performed by: INTERNAL MEDICINE

## 2025-02-18 PROCEDURE — 86480 TB TEST CELL IMMUN MEASURE: CPT

## 2025-02-18 PROCEDURE — 83521 IG LIGHT CHAINS FREE EACH: CPT

## 2025-02-18 PROCEDURE — 84165 PROTEIN E-PHORESIS SERUM: CPT

## 2025-02-18 PROCEDURE — 82784 ASSAY IGA/IGD/IGG/IGM EACH: CPT

## 2025-02-18 PROCEDURE — 86431 RHEUMATOID FACTOR QUANT: CPT

## 2025-02-18 PROCEDURE — 85025 COMPLETE CBC W/AUTO DIFF WBC: CPT

## 2025-02-18 PROCEDURE — 82607 VITAMIN B-12: CPT

## 2025-02-18 PROCEDURE — 36415 COLL VENOUS BLD VENIPUNCTURE: CPT

## 2025-02-18 PROCEDURE — 86225 DNA ANTIBODY NATIVE: CPT

## 2025-02-18 PROCEDURE — 84481 FREE ASSAY (FT-3): CPT

## 2025-02-18 PROCEDURE — 3074F SYST BP LT 130 MM HG: CPT | Performed by: INTERNAL MEDICINE

## 2025-02-18 PROCEDURE — 86334 IMMUNOFIX E-PHORESIS SERUM: CPT

## 2025-02-18 PROCEDURE — 86376 MICROSOMAL ANTIBODY EACH: CPT

## 2025-02-18 PROCEDURE — 3008F BODY MASS INDEX DOCD: CPT | Performed by: INTERNAL MEDICINE

## 2025-02-18 PROCEDURE — 86235 NUCLEAR ANTIGEN ANTIBODY: CPT

## 2025-02-18 PROCEDURE — 86800 THYROGLOBULIN ANTIBODY: CPT

## 2025-02-18 PROCEDURE — 99205 OFFICE O/P NEW HI 60 MIN: CPT | Performed by: INTERNAL MEDICINE

## 2025-02-18 PROCEDURE — 80053 COMPREHEN METABOLIC PANEL: CPT

## 2025-02-18 PROCEDURE — 3079F DIAST BP 80-89 MM HG: CPT | Performed by: INTERNAL MEDICINE

## 2025-02-18 PROCEDURE — 84443 ASSAY THYROID STIM HORMONE: CPT

## 2025-02-18 PROCEDURE — 84439 ASSAY OF FREE THYROXINE: CPT

## 2025-02-18 PROCEDURE — 85652 RBC SED RATE AUTOMATED: CPT

## 2025-02-18 PROCEDURE — 86140 C-REACTIVE PROTEIN: CPT

## 2025-02-18 PROCEDURE — 84550 ASSAY OF BLOOD/URIC ACID: CPT

## 2025-02-18 RX ORDER — METFORMIN HYDROCHLORIDE 500 MG/1
500 TABLET, EXTENDED RELEASE ORAL DAILY
COMMUNITY

## 2025-02-18 RX ORDER — LEVOTHYROXINE SODIUM 50 UG/1
50 TABLET ORAL DAILY
COMMUNITY

## 2025-02-18 RX ORDER — MELOXICAM 15 MG/1
15 TABLET ORAL DAILY
Qty: 30 TABLET | Refills: 0 | Status: SHIPPED | OUTPATIENT
Start: 2025-02-18

## 2025-02-18 RX ORDER — LEVOCETIRIZINE DIHYDROCHLORIDE 5 MG/1
5 TABLET, FILM COATED ORAL EVERY EVENING
COMMUNITY
Start: 2024-12-01

## 2025-02-18 NOTE — PATIENT INSTRUCTIONS
Called patient and left voice message informing patient she can come to office today 10/17/2022 for a medical clearance appointment .    Diagnosis    Palindromic rheumatism is rare, so your GP may not have seen many cases. It can sometimes be confused with conditions like gout and rheumatoid arthritis.    To confirm the diagnosis and to ensure that treatment is started as soon as possible, your GP should refer you to a rheumatologist (zita), who is a consultant with specialist knowledge of these types of conditions.              How will palindromic rheumatism affect me?    Palindromic rheumatism varies from person to person. Some people find that their symptoms completely disappear between attacks, while others only have attacks occasionally.    However, some people experience more problems over time, and may develop rheumatoid arthritis. This is particularly likely in people whose blood tests show rheumatoid factor or anti-CCP, which can be positive in rheumatoid arthritis.    While palindromic rheumatism doesn't cause any permanent damage to the joints, rheumatoid arthritis can. Even if you have a positive test result for these antibodies, you won't necessarily develop rheumatoid arthritis.    Very rarely, a small number of people develop lupus, and this is more likely in people whose blood tests show anti-nuclear antibodies.         Drugs    The main treatments for palindromic rheumatism are drugs to treat the pain, drugs to reduce inflammation, and drugs to treat the condition itself. People respond to treatments in different ways, so it's important to work with your doctors to find the best treatment for you.    Non-steroidal anti-inflammatory drugs (NSAIDs)    Non-steroidal anti-inflammatory drugs block inflammation and are used to reduce pain, stiffness and inflammation during attacks.    Your symptoms may improve when you take these drugs, but the effects aren't long-lasting, so you might have to take them regularly. If they do work, you'll need to take them as soon as an attack starts and carry on until after it's  finished.    There are many NSAIDs available, so your doctor will advise you on the best choices for you. Examples of NSAIDs include naproxen and ibuprofen.    NSAIDs can sometimes have side effects, but your doctor will try to reduce the risk of these. This could be giving you the lowest dose that works for you for the shortest possible time, or by prescribing a type of drug called a proton pump inhibitor, which can help reduce the risk of stomach problems.    Steroid injections    Your doctor might suggest a steroid injection if your joints are very painful, or if your ligaments and tendons are inflamed. Steroid injections can be given directly into a joint or a muscle.    These injections aren't given regularly. They work very quickly, usually within a few days. Some GPs can give them, but they'll usually be given by your consultant or nurse at the hospital.    Disease-modifying anti-rheumatic drugs (DMARDs)    Disease-modifying anti-rheumatic drugs help by tackling the causes of joint inflammation. They're used in palindromic rheumatism to reduce symptoms and flare-ups.    Unlike the other drugs mentioned here, DMARDs treat the condition itself rather than just reducing the pain and stiffness it causes.    DMARDs can be used to prevent attacks or reduce the frequency of them in people who have a more severe form of the condition. They can sometimes take a while to start working, so your doctor will advise you to keep taking them regularly.    The most common DMARD used to prevent attacks of palindromic rheumatism is hydroxychloroquine. However, some people may need stronger DMARDs like sulfasalazine or methotrexate.    When taking some DMARDs, you'll sometimes need to have regular blood monitoring to check for possible side effects, including problems with your liver, kidneys or blood count.        Fast Facts    Fibromyalgia affects two - four percent of people, women more often than men.    Fibromyalgia is not  an autoimmune or inflammation based illness, but research suggests the nervous system is involved.    Doctors diagnose fibromyalgia based on all the patient's relevant symptoms (what you feel), no longer just on the number of tender places during an examination.    There is no test to detect this disease, but you may need lab tests or X-rays to rule out other health problems.    Though there is no cure, medications can reduce symptoms in some patients.    Patients also may feel better with proper self-care, such as exercise and getting enough sleep.    Fibromyalgia is a common neurologic health problem that causes widespread pain and tenderness (sensitivity to touch). The pain and tenderness tend to come and go, and move about the body. Most often, people with this chronic (long-term) illness are fatigued (very tired) and have sleep problems. The diagnosis can be made with a careful examination.    Fibromyalgia is most common in women, though it can occur in men. It most often starts in middle adulthood, but can occur in the teen years and in old age. You are at higher risk for fibromyalgia if you have a rheumatic disease (health problem that affects the joints, muscles and bones). These include osteoarthritis, lupus, rheumatoid arthritis, or ankylosing spondylitis.    What is fibromyalgia?    What causes fibromyalgia?    The causes of fibromyalgia are unclear. They may be different in different people. Current research suggests involvement of the nervous system, particularly the central nervous system (brain and spinal cord). Fibromyalgia is not from an autoimmune, inflammation, joint, or muscle disorder. Fibromyalgia may run in families. There likely are certain genes that can make people more prone to getting fibromyalgia and the other health problems that can occur with it. Genes alone, though, do not cause fibromyalgia.    There is most often some triggering factor that sets off fibromyalgia. It may be spine  problems, arthritis, injury, or other type of physical stress. Emotional stress also may trigger this illness. The result is a change in the way the body \"talks\" with the spinal cord and brain. Levels of brain chemicals and proteins may change. More recently, Fibromyalgia has been described as Central Pain Amplification disorder, meaning the volume of pain sensation in the brain is turned up too high.    Although Fibromyalgia can affect quality of life, it is still considered medically benign. It does not cause any heart attacks, stroke, cancer, physical deformities, or loss of life.         How is fibromyalgia diagnosed?    A doctor will suspect fibromyalgia based on your symptoms. Doctors may require that you have tenderness to pressure or tender points at a specific number of certain spots before saying you have fibromyalgia, but they are not required to make the diagnosis (see the Box). A physical exam can be helpful to detect tenderness and to exclude other causes of muscle pain. There are no diagnostic tests (such as X-rays or blood tests) for this problem. Yet, you may need tests to rule out another health problem that can be confused with fibromyalgia.    Because widespread body pain is the main feature of fibromyalgia, health care providers will ask you to describe your pain. This may help tell the difference between fibromyalgia and other diseases with similar symptoms. Other conditions such as hypothyroidism (underactive thyroid gland) and polymyalgia rheumatica sometimes mimic fibromyalgia. Blood tests can tell if you have either of these problems. Sometimes, fibromyalgia is confused with rheumatoid arthritis or lupus. But, again, there is a difference in the symptoms, physical findings and blood tests that will help your health care provider detect these health problems. Unlike fibromyalgia, these rheumatic diseases cause inflammation in the joints and tissues.    Criteria Needed for a Fibromyalgia  Diagnosis      1. Pain and symptoms over the past week, based on the total of number of painful areas out of 19 parts of the body plus level of severity of these symptoms:    a. Fatigue    b. Waking unrefreshed    c. Cognitive (memory or thought) problems    Plus number of other general physical symptoms    2. Symptoms lasting at least three months at a similar level    3. No other health problem that would explain the pain and other symptoms    How is fibromyalgia treated?    There is no cure for fibromyalgia. However, symptoms can be treated with both non-drug and medication based treatments. Many times the best outcomes are achieved by using multiple types of treatments.    Non-Drug Therapies: People with fibromyalgia should use non-drug treatments as well as any medicines their doctors suggest. Research shows that the most effective treatment for fibromyalgia is physical exercise. Physical exercise should be used in addition to any drug treatment. Patients benefit most from regular aerobic exercises. Other body-based therapies, including Stefan Chi and yoga, can ease fibromyalgia symptoms. Although you may be in pain, low impact physical exercise will not be harmful.    Cognitive behavioral therapy is a type of therapy focused on understanding how thoughts and behaviors affect pain and other symptoms. CBT and related treatments, such as mindfulness, can help patients learn symptom reduction skills that lessen pain. Mindfulness is a non-spiritual meditation practice that cultivates present moment awareness. Mindfulness based stress reduction has been shown to significantly improve symptoms of fibromyalgia.    Other complementary and alternative therapies (sometimes called CAM or integrative medicine), such as acupuncture, chiropractic and massage therapy, can be useful to manage fibromyalgia symptoms. Many of these treatments, though, have not been well tested in patients with fibromyalgia.    It is important to  address risk factors and triggers for fibromyalgia including sleep disorders, such as sleep apnea, and mood problems such as stress, anxiety, panic disorder, and depression. This may require involvement of other specialists such as a Sleep Medicine doctor, Psychiatrist, and therapist.     Medications: The U.[place]. Food and Drug Administration has approved three drugs for the treatment of fibromyalgia. They include two drugs that change some of the brain chemicals (serotonin and norepinephrine) that help control pain levels: duloxetine (Cymbalta) and milnacipran (Savella). Older drugs that affect these same brain chemicals also may be used to treat fibromyalgia. These include amitriptyline (Elavil) and cyclobenzaprine (Flexeril). Other antidepressant drugs can be helpful in some patients. Side effects vary by the drug. Ask your doctor about the risks and benefits of your medicine.    The other drug approved for fibromyalgia is pregabalin (Lyrica). Pregabalin and another drug, gabapentin (Neurontin), work by blocking the over activity of nerve cells involved in pain transmission. These medicines may cause dizziness, sleepiness, swelling and weight gain.    It is strongly recommended to avoid opioid narcotic medications for treating fibromyalgia. The reason for this is that research evidence shows these drugs are not of helpful to most people with fibromyalgia, and will cause greater pain sensitivity or make pain persist. Tramadol (Ultram) may be used to treat fibromyalgia pain if short-term use of an opioid narcotic is needed. Over-the-counter medicines such as acetaminophen (Tylenol) or nonsteroidal anti-inflammatory drugs (commonly called NSAIDs) like ibuprofen (Advil, Motrin) or naproxen (Aleve, Anaprox) are not effective for fibromyalgia pain. Yet, these drugs may be useful to treat the pain triggers of fibromyalgia. Thus, they are most useful in people who have other causes for pain such as arthritis in addition to  fibromyalgia.    For sleep problems, some of the medicines that treat pain also improve sleep. These include cyclobenzaprine (Flexeril), amitriptyline (Elavil), gabapentin (Neurontin) or pregabalin (Lyrica). It is not recommended that patients with fibromyalgia take sleeping medicines like zolpidem (Ambien) or benzodiazepine medications.

## 2025-02-18 NOTE — PROGRESS NOTES
[facility], [address] [place] [address]      Consult     Elodia Laureano Patient Status:  No patient class for patient encounter    5/10/1975 MRN [id number]   Location [facility], [address] [place] [address] Attending No att. providers found   Hosp Day # 0 PCP RICH KELLER     Referring Provider: PCP    Reason for Consultation: Fibromyalgia; osteoarthritis chronic elevation CRP    Body mass index is 33.13 kg/m².      Subjective:    Elodia Laureano is a 49 year old female with further evaluation for chronic generalized pain rule out connective tissue disease      Previously followed by  at [facility] otology last seen 2021    She does have elements of fibromyalgia but has a history of osteoarthritis and degenerative disc disease followed by pain management orthopedic surgeon and orthopedic shoulder specialist.  She also sees podiatrist for her chronic foot issues and neuropathy    Previous autoimmune workup has been negative.  Last done     She follows with the pain clinic, and [place] on multiple narcotics and high doses of gabapentin had noticed some edema slightly in her lower extremities likely related to the gabapentin which she is takes 1200 twice a day.  Previously on Lyrica    States she has never been on Cymbalta    On Lexapro 10 mg daily for anxiety through her PCP does have nonrestorative sleep and fatigue    In the past had issues with cutaneous fungal infections and has been given oral antifungals through her dermatologist    She did have x-rays and was told that she did not have any fractures    She has had persistent discomfort in the left buttock and left hip area    She is also having ongoing problems with neck pain and is supposed to have a cervical epidural, and does not did see Dr. Pennington and had fusion of her cervical spine and lumbar spine    She had an upper extremity EMG with no significant findings    She recently had  physical therapy    On last visit we were concerned about inflammatory arthropathy since she seemed to have some hand swelling, but x-rays and inflammatory workup were all negative.    Previously had tried Celebrex but discontinued this since it didn't seem to be helping her    Now she takes 800 mg of ibuprofen as needed. She feels this is helpful    Thinks she had tried meloxicam in the past but not sure if it    She was generally healthy until 2007 when she was having problems with both shoulder and neck pain.     She has had rotator cuff and labral tear repair of the right shoulder  She has also had lumbar spine fusion in the past    she has been told that she does have degenerative disc disease in the cervical and thoracic spine    She also has a Chiari malformation and has periodic MRIs of her brain but is currently not seeing a neurologist, also has thoracic syrinx    She is on a aggressive pain regimen including Butrans, Lexapro, lorzone, Lyrica, oxycodone    She has a tear of her right knee and known Baker's cyst of the left knee with slight increased pain and stiffness she does follow with Ortho.  We have discussed obtaining x-ray of the knee and she will proceed with Ortho evaluation to consider MRI if no acute findings    Also since her ankle and foot surgery by podiatrist she is having persistent numbness.  She stated x-rays were unrevealing she is open to getting an MRI of the foot to further evaluate this.  Patient's mother sees me for seronegative RA and because of her chronic elevation of CRP she is open to getting MRI to rule out seronegative RA    She would also like to update autoimmune testing again which she has not had done since 2021    History/Other:      Past Medical History:  Past Medical History:    Anesthesia complication    pt. bringing a letter regarding neck positioning and no spinal anesthesia    Asthma (HCC)    Blurred vision, bilateral    Cervicalgia    Chiari I malformation (HCC)     Chronic fatigue fibromyalgia syndrome    Chronic pain syndrome    Encounter for pain management planning    Headache disorder    Lumbago due to displacement of intervertebral disc    Migraines    Mild intermittent asthma without complication (HCC)    Osteoarthritis of spine with radiculopathy, lumbar region    Visual impairment    reading glasses        Past Surgical History:   Past Surgical History:   Procedure Laterality Date    Hysterectomy  09/19/2019    Dr. Layton Meraz    Repair rotator cuff,acute Right     Spine surgery procedure unlisted  2012    L5S1 fusion       Social History:  reports that she has been smoking cigarettes. She has a 54 pack-year smoking history. She has never used smokeless tobacco. She reports that she does not drink alcohol and does not use drugs.    Family History:   Family History   Problem Relation Age of Onset    Stroke Sister     Stroke Other        Allergies: Allergies[1]    Current Medications:  Current Outpatient Medications   Medication Sig Dispense Refill    tiZANidine 4 MG Oral Tab Take 1 tablet (4 mg total) by mouth 3 (three) times daily as needed.      metFORMIN  MG Oral Tablet 24 Hr Take 1 tablet (500 mg total) by mouth daily.      levothyroxine 50 MCG Oral Tab Take 1 tablet (50 mcg total) by mouth daily.      levocetirizine 5 MG Oral Tab Take 1 tablet (5 mg total) by mouth every evening.      Meloxicam 15 MG Oral Tab Take 1 tablet (15 mg total) by mouth daily. 30 tablet 0    fluticasone furoate-vilanterol (BREO ELLIPTA) 200-25 MCG/INH Inhalation Aerosol Powder, Breath Activated Inhale 1 puff into the lungs daily. 3 each 1    hydrocortisone 10 MG Oral Tab Take 0.5 tablets (5 mg total) by mouth. 15 mg in the AM and 5 mg in the PM  Patient to updose whenever she is sick or injured      gabapentin 600 MG Oral Tab Take 2 tablets (1,200 mg total) by mouth 2 (two) times daily.      ERGOCALCIFEROL 1.25 MG (06438 UT) Oral Cap TAKE 1 CAPSULE BY MOUTH EVERY 7 DAYS 12  capsule 0    OxyCODONE HCl IR 10 MG Oral Tab Take 1 tablet (10 mg total) by mouth every 4 (four) hours as needed.      BUTRANS 20 MCG/HR Transdermal Patch Weekly Place 1 patch onto the skin once a week. 4 patch 0    Butalbital-APAP-Caffeine -40 MG Oral Tab TAKE ONE TABLET BY MOUTH EVERY 6 HOURS AS NEEDED FOR HEADACHE 60 tablet 2    Chlorzoxazone (LORZONE) 750 MG Oral Tab Take 375 mg by mouth 2 (two) times daily. 60 tablet 2    budesonide 0.5 MG/2ML Inhalation Suspension VVN BID      Levalbuterol HCl 1.25 MG/3ML Inhalation Nebu Soln ONE  VIAL VIA  NEB Q 4 H PRN      PROAIR  (90 Base) MCG/ACT Inhalation Aero Soln INL TWO PUFFS INTO THE LUNGS Q 4 H PRN  3    escitalopram 10 MG Oral Tab Take 3 tablets (30 mg total) by mouth daily.  2      No current facility-administered medications for this visit.     (Not in a hospital admission)      Review of Systems:     Constitutional: Negative for chills, ,+ fatigue, no fever and unexpected weight change.    HENT: Negative for congestion, and mouth sores.    Eyes: Negative for photophobia, pain, redness and visual disturbance.    Respiratory: Negative for apnea, cough, chest tightness, shortness of breath, wheezing and stridor.    Cardiovascular: Negative for chest pain, palpitations and leg swelling.    Gastrointestinal: Negative for abdominal distention, abdominal pain, blood in stool, constipation, diarrhea and nausea.    Endocrine: Negative.     Genitourinary: Negative for decreased urine volume, difficulty urinating, dyspareunia, dysuria, flank pain, and frequency.    Musculoskeletal: + arthralgias, no gait problem and joint swelling.    Skin: Negative for color change, pallor and rash. No raynauds or digital ulcerations no sclerodactly.    Allergic/Immunologic: Negative.    Neurological: Negative for dizziness, tremors, seizures, syncope, speech difficulty, weakness, light-headedness, numbness and headaches.    Hematological: Does not bruise/bleed  easily.    Psychiatric/Behavioral: Negative for confusion, decreased concentration, hallucinations, self-injury,+ sleep disturbance and suicidal ideas or mild depression.+anxiety    Objective:   Vitals:    02/18/25 1301   BP: 120/80   Pulse: 90   Resp: 16   Temp: 98.3 °F (36.8 °C)          Constitutional: is oriented to person, place, and time. Appears well-developed and well-nourished. No distress.    HEENT: Normocephalic; EOMI; no jvd; no LAD; no oral or nasal ulcers.     Eyes: Conjunctivae and EOM are normal. Pupils are equal, round, and reactive to light.     Neck: Normal range of motion. No thyromegaly present.    Cardiovascular: RRR, no murmurs.    Lungs: Clear, Bilateral air entry, no wheezes.    Abdominal: Soft.    Musculoskeletal:         Joint Exam 02/18/2025        Right  Left   Sternoclavicular   Tender   Tender   Acromioclavicular   Tender   Tender   Elbow   Tender   Tender   Cervical Spine   Tender      Thoracic Spine   Tender      Lumbar Spine   Tender      Sacroiliac   Tender   Tender   Hip   Tender   Tender   Knee   Tender   Tender   Ankle   Tender   Tender   Subtalar   Tender   Tender   Tarsometatarsal      Tender   MTP 1   Tender   Tender   MTP 2   Tender      MTP 3   Tender      MTP 4   Tender      PIP 2 (toe)   Tender      PIP 3 (toe)   Tender      PIP 4 (toe)   Tender           Swollen: 0      Tender: 28          Right shoulder: Exhibits normal range of motion on abduction and internal rotation, no tenderness, no bony tenderness, no deformity, no laceration, no pain and no spasm.        Left shoulder: Exhibits normal range of motion on abduction and internal and external rotation.  no tenderness, no bony tenderness, no swelling, no effusion, no deformity, no pain, no spasm and normal strength.        Right elbow:  Exhibits normal range of motion, no swelling, no effusion and no deformity. No tenderness found. No medial epicondyle, no lateral epicondyle and no olecranon process tenderness noted.  There are no contractures or tophi or nodules.        Left elbow:  Normal range of motion, no swelling, no effusion and no deformity. No medial epicondyle, no lateral epicondyle and no olecranon process tenderness noted. There are no contractures or tophi or nodules.        Right wrist:  Exhibits normal range of motion, no tenderness, no bony tenderness, no swelling, no effusion and no crepitus. Flexion and extension intact w/o limitation.        Left wrist: Exhibits normal range of motion, no tenderness, no bony tenderness, no swelling, no effusion, no crepitus and no deformity. Flexion and extension intact without limitation.        Right hip: Exhibits normal range of motion, normal strength, no tenderness, no bony tenderness, no swelling and no crepitus.        Right hand: No synovitis of MCP,PIP or DIP joints; no Bouchards or Heberden nodules noted;  strength: 100%.        Left hand: No synovitis of MCP,PIP or DIP joints; no Bouchards or Heberden nodules noted;  strength: 100%.        Left hip: Exhibits normal range of motion, normal strength, no tenderness, no bony tenderness, No swelling and no crepitus.        Right knee: Exhibits normal range of motion, no swelling, no effusion, no ecchymosis, no deformity and no erythema. No tenderness found. No medial joint line, no lateral joint line, no MCL and no LCL tenderness noted. mod crepitation on flexion of knee and extension normal. Small effusion        Left knee:  Exhibits normal range of motion, no swelling, no effusion, no ecchymosis and no erythema. + tenderness found. No medial joint line, no lateral joint line and no patellar tendon tenderness noted. mod crepitation on flexion of the knee. Extension intact and normal. Small effusion; small baker cyst         Right ankle: No swelling, no deformity. No tenderness. Dorsiflexion and plantar flexion intact without limitation in range of motion.        Left ankle: Exhibits no swelling. No tenderness. No  lateral malleolus and no medial malleolus tenderness found. Achilles tendon normal. Achilles tendon exhibits no pain, no defect and normal Perez's test results.  Dorsiflexion and plantar flexion intact without limitation in range of motion.        Cervical back: Exhibits normal range of motion, no tenderness, no bony tenderness, no swelling, no pain and + spasm.        Thoracic back: Exhibits normal range of motion, no tenderness, no bony tenderness and + spasm.        Lumbar back:  Exhibits normal range of motion, no tenderness, no bony tenderness, no pain and + spasm.        Right foot: normal. There is normal range of motion, no tenderness, no bony tenderness, no crepitus and no laceration. There is no synovitis + tenderness of the MTP joints to palpation.  Mild bony enlargement first MTP        Left foot: normal. There is normal range of motion, no tenderness, no bony tenderness and no crepitus. There is no synovitis + tenderness of the MTP joints to palpation.  Mild bony enlargement first MTP joint    Lymphadenopathy: No submental, no submandibular, and no occipital adenopathy present, has no cervical adenopathy or axillary lympadenopathy.    Neurological: Alert and oriented. No focal motor or sensory abnormalities. Strength is 5/5 Upper Extremities/Lower Extremities proximally and distally.    Skin: Skin is warm, dry and intact.    Psychiatric: Normal behavior.    Results:    Labs:      Lab Results   Component Value Date    WBC 6.55 04/01/2021    RBC 4.25 04/01/2021    HGB 13.8 04/01/2021    HCT 41.0 04/01/2021    MCV 96.5 04/01/2021    MCH 32.5 04/01/2021    MCHC 33.7 04/01/2021    RDW 11.9 04/01/2021     04/01/2021    MPV 9.8 06/10/2011       No components found for: \"RELY\", \"NMET\", \"MYEL\", \"PROMY\", \"JULIO\", \"ABSNEUTS\", \"ABSBANDS\", \"ABMM\", \"ABMY\", \"ABPM\", \"ABBL\"      Lab Results   Component Value Date     04/01/2021    K 4.37 04/01/2021    CO2 26.6 04/01/2021    BUN 15.0 04/01/2021    ALB 4.2  04/01/2021    AST 22 04/01/2021    ALT 35 (H) 04/01/2021          No components found for: \"ESRWESTERGRN\"       No results found for: \"CRP\"      No results found for: \"COLOR\", \"CLARITY\", \"UROBILINOGEN\", \"YEAST\"  @LABRCNTIP(RF,B12)@      [unfilled]    Imaging:  No results found.      EMG from September 2021 no evidence for cervical radiculopathy in either upper extremity, no evidence for significant carpal tunnel syndrome on either side  No evidence for ulnar neuropathy  No evidence for proximal median neuropathy at the elbow  No evidence for radial neuropathy.    Bilateral hand and wrist x-rays from July 2021 right hand and wrist with no significant findings  Left hand and wrist with no significant findings.    Bilateral hand x-ray from October 2019, appears somewhat demineralized otherwise no significant findings    Bilateral wrist x-rays with no significant findings.    Cervical spine x-ray from July 2019, mild degenerative disc disease at C5-6 and C6-7  Mild facet arthritis at C6-7, mild kyphosis of the cervical spine    lumbar spine x-ray from July 2019 anterior fusion at L5-S1, mild degenerative disc disease at L2-5, facet arthritis at L5-S1     Assessment & Plan:      Chronic pain syndrome which appears to be related to primarily to fibromyalgia and osteoarthritis  Chronic elevation CRP likely marked multifactorial and also underlying obesity    She continues with pain clinic and is on an aggressive medicine regimen    Recent fall with injury to the left hip area, we'll review her pelvic x-rays  She may have some sort of bone contusion which might take a little while to heal  She also wonders about possible tendon or ligament sprain which is also possible    Will administer a left trochanteric bursal injection to see if this is of any benefit    She will continue to follow with surgery and pain clinic for her other chronic pain complaints    Ongoing hand pain of unclear etiology, although there is a family  history of rheumatoid arthritis, patient's x-rays and labs are not suggestive of inflammatory arthropathy update x-rays of the hands and feet proceed with MRI of the foot because persistent pain and neuropathy rule out other etiology      Left knee pain likely arthritic in nature do not see significant effusion will get x-ray of the knee and she will follow-up with Ortho to consider MRI    Offered meloxicam 15 mg daily.  It appears she tried this before and Celebrex but she is willing to retry    Previous EMG showed no evidence for carpal tunnel or other neuropathy that could contribute to hand pain in 2021    Risk of NSAIDs discussed asked her to obtain labs and update autoimmune testing as she wants to make sure she is not involving any other autoimmune diseases    Recurrent cutaneous fungal infection, question need for infectious disease evaluation or immunologic evaluation    For chronic pain perspective suggested the switch Lexapro to Cymbalta through her PCP with close follow-up especially with the other medications that could interact or through her pain specialist    Agree with weaning narcotics slowly from pain specialist as tolerated    She has some soft tissue edema lower extremities likely related to high doses of gabapentin it does not appear to be inflammatory in nature or immune mediated    Consider palindromic rheumatism and hydroxychloroquine trial.  She is 5 feet 3 she is open to this will read further discussed potential risk side effects including rare retinal toxicity need for baseline eye exam.  If her inflammatory markers are elevated I am more than willing to do this trial and see her back in 6 months to see if she has response.  She understands that is a trial basis only    Addressing her stressors anxiety insomnia would help her overall pain levels continue to follow-up with orthopedic and pain specialist        Education and counseling provided to patient.  Instructed patient to call my  office or seek medical attention immediately if symptoms worsen. Risks and side effects of medications and diagnosis discussed in detail and patient was given written information on new prescribed medications.    Return to clinic:  Return in about 6 months (around 8/18/2025).    Cecilia Burnett MD  2/18/2025           [1]   Allergies  Allergen Reactions    Erythromycin NAUSEA AND VOMITING     vomiting    Eggs Or Egg-Derived Products OTHER (SEE COMMENTS)     abd pain    Levaquin [Levofloxacin] RASH

## 2025-02-19 ENCOUNTER — TELEPHONE (OUTPATIENT)
Dept: RHEUMATOLOGY | Facility: CLINIC | Age: 50
End: 2025-02-19

## 2025-02-19 LAB
CCP IGG SERPL-ACNC: 1.2 U/ML (ref 0–6.9)
DSDNA IGG SERPL IA-ACNC: 0.6 IU/ML
ENA RNP IGG SER IA-ACNC: 1.6 U/ML
ENA SM IGG SER IA-ACNC: <0.7 U/ML
ENA SS-A IGG SER IA-ACNC: <0.4 U/ML
ENA SS-B IGG SER IA-ACNC: <0.4 U/ML
U1 SNRNP IGG SER IA-ACNC: 1.4 U/ML

## 2025-02-19 NOTE — TELEPHONE ENCOUNTER
Component  Ref Range & Units 1 d ago 2 yr ago 3 yr ago   TSH  0.550 - 4.780 uIU/mL 0.461 Low  0.611 R, CM 1.270 R   [facility] ([facility]) [place]           Her TSH is abnormal but only slightly off we will fax this to her PCP and they can decide if they need to adjust dosing

## 2025-02-20 LAB
ALBUMIN SERPL ELPH-MCNC: 4.37 G/DL (ref 3.75–5.21)
ALBUMIN/GLOB SERPL: 1.6 {RATIO} (ref 1–2)
ALPHA1 GLOB SERPL ELPH-MCNC: 0.3 G/DL (ref 0.19–0.46)
ALPHA2 GLOB SERPL ELPH-MCNC: 0.82 G/DL (ref 0.48–1.05)
B-GLOBULIN SERPL ELPH-MCNC: 0.83 G/DL (ref 0.68–1.23)
GAMMA GLOB SERPL ELPH-MCNC: 0.77 G/DL (ref 0.62–1.7)
KAPPA LC FREE SER-MCNC: 1.88 MG/DL (ref 0.33–1.94)
KAPPA LC FREE/LAMBDA FREE SER NEPH: 1.19 {RATIO} (ref 0.26–1.65)
LAMBDA LC FREE SERPL-MCNC: 1.58 MG/DL (ref 0.57–2.63)
M TB IFN-G CD4+ T-CELLS BLD-ACNC: 0.02 IU/ML
M TB TUBERC IFN-G BLD QL: NEGATIVE
M TB TUBERC IGNF/MITOGEN IGNF CONTROL: >10 IU/ML
PROT SERPL-MCNC: 7.1 G/DL (ref 5.7–8.2)
QFT TB1 AG MINUS NIL: -0.01 IU/ML
QFT TB2 AG MINUS NIL: 0 IU/ML

## 2025-02-20 NOTE — TELEPHONE ENCOUNTER
Spoke to patient regarding the below message:              Component  Ref Range & Units 1 d ago 2 yr ago 3 yr ago   TSH  0.550 - 4.780 uIU/mL 0.461 Low  0.611 R, CM 1.270 R   [facility] ([facility]) [place]              Her TSH is abnormal but only slightly off we will fax this to her PCP and they can decide if they need to adjust dosing        Patient verbalized understanding and denied any questions at this time. She will reach out to her PCP's office in a couple of days if she does not hear anything.     Patient's lab results faxed over to her PCP's office

## 2025-03-21 ENCOUNTER — HOSPITAL ENCOUNTER (OUTPATIENT)
Dept: GENERAL RADIOLOGY | Age: 50
Discharge: HOME | End: 2025-03-21
Attending: INTERNAL MEDICINE
Payer: COMMERCIAL

## 2025-03-21 ENCOUNTER — APPOINTMENT (OUTPATIENT)
Dept: MRI IMAGING | Age: 50
End: 2025-03-21
Attending: INTERNAL MEDICINE
Payer: COMMERCIAL

## 2025-03-21 ENCOUNTER — HOSPITAL ENCOUNTER (OUTPATIENT)
Dept: MRI IMAGING | Age: 50
Discharge: HOME | End: 2025-03-21
Attending: INTERNAL MEDICINE
Payer: COMMERCIAL

## 2025-03-21 DIAGNOSIS — M79.7 CHRONIC FATIGUE SYNDROME WITH FIBROMYALGIA: ICD-10-CM

## 2025-03-21 DIAGNOSIS — M47.26 OSTEOARTHRITIS OF SPINE WITH RADICULOPATHY, LUMBAR REGION: ICD-10-CM

## 2025-03-21 DIAGNOSIS — G93.32 CHRONIC FATIGUE SYNDROME WITH FIBROMYALGIA: ICD-10-CM

## 2025-03-21 DIAGNOSIS — G89.4 CHRONIC PAIN SYNDROME: ICD-10-CM

## 2025-03-21 DIAGNOSIS — M79.671 FOOT PAIN, RIGHT: ICD-10-CM

## 2025-03-21 PROCEDURE — 73720 MRI LWR EXTREMITY W/O&W/DYE: CPT | Performed by: INTERNAL MEDICINE

## 2025-03-21 PROCEDURE — 73130 X-RAY EXAM OF HAND: CPT | Performed by: INTERNAL MEDICINE

## 2025-03-21 PROCEDURE — A9575 INJ GADOTERATE MEGLUMI 0.1ML: HCPCS | Performed by: INTERNAL MEDICINE

## 2025-03-21 PROCEDURE — 73630 X-RAY EXAM OF FOOT: CPT | Performed by: INTERNAL MEDICINE

## 2025-03-21 PROCEDURE — 73560 X-RAY EXAM OF KNEE 1 OR 2: CPT | Performed by: INTERNAL MEDICINE

## 2025-03-21 RX ORDER — GADOTERATE MEGLUMINE 376.9 MG/ML
20 INJECTION INTRAVENOUS
Status: COMPLETED | OUTPATIENT
Start: 2025-03-21 | End: 2025-03-21

## 2025-03-21 RX ADMIN — GADOTERATE MEGLUMINE 18 ML: 376.9 INJECTION INTRAVENOUS at 12:01:00

## 2025-03-23 DIAGNOSIS — M12.30 PALINDROMIC RHEUMATISM: ICD-10-CM

## 2025-03-23 DIAGNOSIS — M47.26 OSTEOARTHRITIS OF SPINE WITH RADICULOPATHY, LUMBAR REGION: Primary | ICD-10-CM

## 2025-03-24 DIAGNOSIS — M47.26 OSTEOARTHRITIS OF SPINE WITH RADICULOPATHY, LUMBAR REGION: ICD-10-CM

## 2025-03-24 DIAGNOSIS — G93.32 CHRONIC FATIGUE SYNDROME WITH FIBROMYALGIA: ICD-10-CM

## 2025-03-24 DIAGNOSIS — M12.30 PALINDROMIC RHEUMATISM: Primary | ICD-10-CM

## 2025-03-24 DIAGNOSIS — M79.7 CHRONIC FATIGUE SYNDROME WITH FIBROMYALGIA: ICD-10-CM

## 2025-03-24 DIAGNOSIS — G89.4 CHRONIC PAIN SYNDROME: ICD-10-CM

## 2025-03-24 RX ORDER — MELOXICAM 15 MG/1
15 TABLET ORAL DAILY
Qty: 90 TABLET | Refills: 0 | Status: SHIPPED | OUTPATIENT
Start: 2025-03-24

## 2025-03-24 NOTE — TELEPHONE ENCOUNTER
LOV: 02/18/2025    Future Appointments   Date Time Provider Department Center   8/19/2025  1:40 PM Cecilia Burnett MD [id number] [address]       LF: 02/18/2025    QTY: 30    Refills: 0    LABS:   Component      Latest Ref Rng 2/18/2025   WBC      4.0 - 11.0 x10(3) uL 11.4 (H)    RBC      3.80 - 5.30 x10(6)uL 4.53    Hemoglobin      12.0 - 16.0 g/dL 14.4    Hematocrit      35.0 - 48.0 % 44.5    Platelet Count      150.0 - 450.0 10(3)uL 304.0    MCV      80.0 - 100.0 fL 98.2    MCH      26.0 - 34.0 pg 31.8    MCHC      31.0 - 37.0 g/dL 32.4    RDW      % 13.7    Prelim Neutrophil Abs      1.50 - 7.70 x10 (3) uL 7.72 (H)    Neutrophils Absolute      1.50 - 7.70 x10(3) uL 7.72 (H)    Lymphocytes Absolute      1.00 - 4.00 x10(3) uL 2.24    Monocytes Absolute      0.10 - 1.00 x10(3) uL 0.97    Eosinophils Absolute      0.00 - 0.70 x10(3) uL 0.28    Basophils Absolute      0.00 - 0.20 x10(3) uL 0.07    Immature Granulocyte Absolute      0.00 - 1.00 x10(3) uL 0.07    Neutrophils %      % 68.1    Lymphocytes %      % 19.7    Monocytes %      % 8.5    Eosinophils %      % 2.5    Basophils %      % 0.6    Immature Granulocyte %      % 0.6    Glucose      70 - 99 mg/dL 94    Sodium      136 - 145 mmol/L 141    Potassium      3.5 - 5.1 mmol/L 4.6    Chloride      98 - 112 mmol/L 101    Carbon Dioxide, Total      21.0 - 32.0 mmol/L 31.0    ANION GAP      0 - 18 mmol/L 9    BUN      9 - 23 mg/dL 20    CREATININE      0.55 - 1.02 mg/dL 1.04 (H)    CALCIUM      8.7 - 10.6 mg/dL 10.4    CALCULATED OSMOLALITY      275 - 295 mOsm/kg 294    EGFR      >=60 mL/min/1.73m2 66    AST (SGOT)      <34 U/L 17    ALT (SGPT)      10 - 49 U/L 37    ALKALINE PHOSPHATASE      39 - 100 U/L 85    Total Bilirubin      0.3 - 1.2 mg/dL 0.2 (L)    PROTEIN, TOTAL      5.7 - 8.2 g/dL 7.6    Albumin      3.2 - 4.8 g/dL 5.1 (H)    Globulin      2.0 - 3.5 g/dL 2.5    A/G Ratio      1.0 - 2.0  2.0    Patient Fasting for CMP? Yes    SED RATE      0 - 20  mm/Hr 67 (H)       Legend:  (H) High  (L) Low

## 2025-03-24 NOTE — TELEPHONE ENCOUNTER
Spoke with patient and informed of the following results and recommendations from Dr. Burnett. Understanding verbalized.     Patient states she is interested in proceeding with a trial of Hydroxychloroquine. Aware to repeat labs.     Follow up scheduled for 8/19/25 at 140pm.       Let patient know x-rays look great very minimal arthritis changes on MRI of the foot there is no evidence of rheumatoid arthritis at this time     Same plan with anti-inflammatory     I am still okay with drawing hydroxychloroquine trial patient would like to proceed understanding it is a trial basis to see if will help her pain she will need to be on it for 4 to 6 months to notice some benefit and see repeat her labs and inflammation levels to see if they improve     At this time autoimmune workup and x-rays look good

## 2025-03-24 NOTE — TELEPHONE ENCOUNTER
Pt returning our call regarding the trial of hydroxychloroquine. Pt has recently started on  Metformin 500mg daily and wondering if there is an interaction with hydroxychloroquine.

## 2025-03-27 RX ORDER — HYDROXYCHLOROQUINE SULFATE 200 MG/1
200 TABLET, FILM COATED ORAL DAILY
Qty: 90 TABLET | Refills: 0 | Status: SHIPPED | OUTPATIENT
Start: 2025-03-27

## 2025-06-27 DIAGNOSIS — M12.30 PALINDROMIC RHEUMATISM: ICD-10-CM

## 2025-06-30 RX ORDER — MELOXICAM 15 MG/1
15 TABLET ORAL DAILY
Qty: 90 TABLET | Refills: 0 | OUTPATIENT
Start: 2025-06-30

## 2025-08-07 RX ORDER — MELOXICAM 15 MG/1
15 TABLET ORAL DAILY
Qty: 90 TABLET | Refills: 0 | Status: SHIPPED | OUTPATIENT
Start: 2025-08-07

## 2025-08-19 ENCOUNTER — OFFICE VISIT (OUTPATIENT)
Dept: RHEUMATOLOGY | Facility: CLINIC | Age: 50
End: 2025-08-19

## 2025-08-19 VITALS
WEIGHT: 181 LBS | BODY MASS INDEX: 32.07 KG/M2 | SYSTOLIC BLOOD PRESSURE: 90 MMHG | HEART RATE: 75 BPM | HEIGHT: 63 IN | OXYGEN SATURATION: 98 % | RESPIRATION RATE: 16 BRPM | DIASTOLIC BLOOD PRESSURE: 62 MMHG | TEMPERATURE: 98 F

## 2025-08-19 DIAGNOSIS — Z01.89 ENCOUNTER FOR PAIN MANAGEMENT PLANNING: ICD-10-CM

## 2025-08-19 DIAGNOSIS — M79.7 CHRONIC FATIGUE SYNDROME WITH FIBROMYALGIA: ICD-10-CM

## 2025-08-19 DIAGNOSIS — F40.8 OTHER PHOBIC ANXIETY DISORDERS: ICD-10-CM

## 2025-08-19 DIAGNOSIS — G93.32 CHRONIC FATIGUE SYNDROME WITH FIBROMYALGIA: ICD-10-CM

## 2025-08-19 DIAGNOSIS — M12.30 PALINDROMIC RHEUMATISM: Primary | ICD-10-CM

## 2025-08-19 DIAGNOSIS — F31.12 BIPOLAR AFFECTIVE DISORDER, CURRENTLY MANIC, MODERATE (HCC): ICD-10-CM

## 2025-08-19 PROCEDURE — 3008F BODY MASS INDEX DOCD: CPT | Performed by: INTERNAL MEDICINE

## 2025-08-19 PROCEDURE — 3074F SYST BP LT 130 MM HG: CPT | Performed by: INTERNAL MEDICINE

## 2025-08-19 PROCEDURE — 99214 OFFICE O/P EST MOD 30 MIN: CPT | Performed by: INTERNAL MEDICINE

## 2025-08-19 PROCEDURE — 3078F DIAST BP <80 MM HG: CPT | Performed by: INTERNAL MEDICINE

## 2025-08-19 RX ORDER — ACYCLOVIR 400 MG/1
1 TABLET ORAL
COMMUNITY
Start: 2025-06-13

## 2025-08-19 RX ORDER — HYDROXYCHLOROQUINE SULFATE 200 MG/1
200 TABLET, FILM COATED ORAL DAILY
Qty: 90 TABLET | Refills: 0 | Status: SHIPPED | OUTPATIENT
Start: 2025-08-19

## 2025-08-28 DIAGNOSIS — M12.30 PALINDROMIC RHEUMATISM: ICD-10-CM

## 2025-08-28 RX ORDER — HYDROXYCHLOROQUINE SULFATE 200 MG/1
200 TABLET, FILM COATED ORAL DAILY
Qty: 90 TABLET | Refills: 0 | Status: SHIPPED | OUTPATIENT
Start: 2025-08-28

## (undated) DEVICE — STRYKER HARMONIC 36CM REPRCSED

## (undated) DEVICE — TUBING CYSTO

## (undated) DEVICE — TIP COVER ACCESSORY

## (undated) DEVICE — PKS LYONS DISSECTING FORCEPS 5MM/33CM: Brand: PK TECHNOLOGY

## (undated) DEVICE — SOLUTION SURG DURA PREP HAZMAT

## (undated) DEVICE — GLOVE SURG TRIUMPH SZ 6

## (undated) DEVICE — STERILE POLYISOPRENE POWDER-FREE SURGICAL GLOVES: Brand: PROTEXIS

## (undated) DEVICE — MEGA NEEDLE DRIVER: Brand: ENDOWRIST;DAVINCI SI

## (undated) DEVICE — MEDIUM-LARGE CLIP APPLIER: Brand: ENDOWRIST;DAVINCI SI

## (undated) DEVICE — PK DISSECTING FORCEPS: Brand: ENDOWRIST;DAVINCI SI

## (undated) DEVICE — GLOVE SURG TRIUMPH SZ 71/2

## (undated) DEVICE — KENDALL SCD EXPRESS SLEEVES, KNEE LENGTH, MEDIUM: Brand: KENDALL SCD

## (undated) DEVICE — SOL  .9 1000ML BAG

## (undated) DEVICE — SUTURE PDS II 0 CT-1

## (undated) DEVICE — INSUFFLATION NEEDLE TO ESTABLISH PNEUMOPERITONEUM.: Brand: INSUFFLATION NEEDLE

## (undated) DEVICE — SUCTION/IRRIGATOR: Brand: ENDOWRIST;DAVINCI SI

## (undated) DEVICE — TENACULUM FORCEPS: Brand: ENDOWRIST;DAVINCI SI

## (undated) DEVICE — SOL LACT RINGERS 3000ML

## (undated) DEVICE — ELECTRO LUBE IS A SINGLE PATIENT USE DEVICE THAT IS INTENDED TO BE USED ON ELECTROSURGICAL ELECTRODES TO REDUCE STICKING.: Brand: KEY SURGICAL ELECTRO LUBE

## (undated) DEVICE — PK INSTRUMENT CORD, G400 GENERATOR: Brand: PK

## (undated) DEVICE — 2, DISPOSABLE SUCTION/IRRIGATOR WITHOUT DISPOSABLE TIP: Brand: STRYKEFLOW

## (undated) DEVICE — SUTURE MONOCRYL 4-0 PS-2

## (undated) DEVICE — 40580 - THE PINK PAD - ADVANCED TRENDELENBURG POSITIONING KIT: Brand: 40580 - THE PINK PAD - ADVANCED TRENDELENBURG POSITIONING KIT

## (undated) DEVICE — TROCAR: Brand: KII FIOS FIRST ENTRY

## (undated) DEVICE — DV KIT ACCESSORY 3-ARM DISP

## (undated) DEVICE — PROGRASP FORCEPS: Brand: ENDOWRIST;DAVINCI SI

## (undated) DEVICE — GYN LAP/ROBOTIC: Brand: MEDLINE INDUSTRIES, INC.

## (undated) NOTE — LETTER
Pre-Procedure  Delories Ang    Dr. Conchis Adam       I acknowledge that I have been informed of the risk involved by refusing the Urine pregnancy test on Romaine Shell.     I, thereby, release Neli Cueva

## (undated) NOTE — LETTER
[facility] [address], [address]  Consent for Procedure/Sedation    Date:     Time:       1.  I authorize the performance upon Clarissa Jackson the following:  PELVIC VENOGRAM     2. I authorize Dr. Wong Wagner (and whomever is designate Witness: _________________________      Date: ___________________    Printed: 2019   8:53 AM  Patient Name: Norma Ruby        : 5/10/1975       Medical Record #: [id number]